# Patient Record
Sex: FEMALE | Race: WHITE | Employment: STUDENT | URBAN - METROPOLITAN AREA
[De-identification: names, ages, dates, MRNs, and addresses within clinical notes are randomized per-mention and may not be internally consistent; named-entity substitution may affect disease eponyms.]

---

## 2017-01-17 ENCOUNTER — OFFICE VISIT (OUTPATIENT)
Dept: ONCOLOGY | Age: 27
End: 2017-01-17

## 2017-01-17 VITALS
BODY MASS INDEX: 31.79 KG/M2 | HEART RATE: 97 BPM | WEIGHT: 186.2 LBS | DIASTOLIC BLOOD PRESSURE: 79 MMHG | HEIGHT: 64 IN | SYSTOLIC BLOOD PRESSURE: 110 MMHG | OXYGEN SATURATION: 97 % | TEMPERATURE: 98.6 F | RESPIRATION RATE: 18 BRPM

## 2017-01-17 DIAGNOSIS — I82.4Y2 ACUTE DEEP VEIN THROMBOSIS (DVT) OF PROXIMAL VEIN OF LEFT LOWER EXTREMITY (HCC): ICD-10-CM

## 2017-01-17 DIAGNOSIS — I26.99 OTHER PULMONARY EMBOLISM WITHOUT ACUTE COR PULMONALE, UNSPECIFIED CHRONICITY (HCC): Primary | ICD-10-CM

## 2017-01-17 RX ORDER — METHOCARBAMOL 500 MG/1
TABLET, FILM COATED ORAL 4 TIMES DAILY
COMMUNITY

## 2017-01-17 RX ORDER — CYCLOBENZAPRINE HCL 5 MG
5 TABLET ORAL
COMMUNITY

## 2017-01-17 NOTE — MR AVS SNAPSHOT
Visit Information Date & Time Provider Department Dept. Phone Encounter #  
 1/17/2017  3:00 PM David Washington  [facility] at [facility] [phone] Your Appointments 4/26/2017  2:45 PM  
ROUTINE CARE with Laila Keita MD  
[facility] Internal Medicine [address]) Appt Note: 6 month follow up [address] [place] [address] [address] [place] [address]  
[phone]  
  
   
 [address] Upcoming Health Maintenance Date Due  
 HPV AGE 9Y-34Y (1 of 3 - Female 3 Dose Series) 2/26/2002 Pneumococcal 19-64 Medium Risk (1 of 1 - PPSV23) 2/26/2010 DTaP/Tdap/Td series (1 - Tdap) 2/26/2012 PAP AKA CERVICAL CYTOLOGY 2/26/2012 Allergies as of 1/17/2017  Review Complete On: 1/17/2017 By: Jarvis Coffey LPN No Known Allergies Current Immunizations  Reviewed on 12/5/2016 No immunizations on file. Not reviewed this visit Vitals BP Pulse Temp Resp Height(growth percentile) Weight(growth percentile) 110/79 (BP 1 Location: Left arm, BP Patient Position: Sitting) 97 98.6 °F (37 °C) (Oral) 18 5' 4\" (1.626 m) 186 lb 3.2 oz (84.5 kg) LMP SpO2 BMI OB Status Smoking Status 12/05/2016 97% 31.96 kg/m2 Having regular periods Current Some Day Smoker Vitals History BMI and BSA Data Body Mass Index Body Surface Area 31.96 kg/m 2 1.95 m 2 Preferred Pharmacy Pharmacy Name Phone [id number] [phone] Your Updated Medication List  
  
   
This list is accurate as of: 1/17/17  3:46 PM.  Always use your most recent med list.  
  
  
  
  
 albuterol 90 mcg/actuation inhaler Commonly known as:  PROVENTIL HFA Take 1 Puff by inhalation every six (6) hours as needed for Wheezing. Indications: EXERCISE-INDUCED BRONCHOSPASM PREVENTION  
  
 ALLEGRA 180 mg tablet Generic drug:  fexofenadine Take 180 mg by mouth daily as needed for Allergies. CLA PO Take 1 Tab by mouth daily. cyanocobalamin 500 mcg tablet Commonly known as:  VITAMIN B12 Take 500 mcg by mouth daily. cyclobenzaprine 5 mg tablet Commonly known as:  FLEXERIL Take 5 mg by mouth. FISH OIL 1,000 mg Cap Generic drug:  omega-3 fatty acids-vitamin e Take 1 Cap by mouth. methocarbamol 500 mg tablet Commonly known as:  ROBAXIN Take  by mouth four (4) times daily. modafinil 100 mg tablet Commonly known as:  PROVIGIL Take  by mouth every morning.  
  
 multivitamin tablet Commonly known as:  ONE A DAY Take 1 Tab by mouth daily. MYOCALM 25-50-20-10 mg Tab Generic drug:  Dhaval La-Mag C-Pas Flw Ex-Carolyn Rt Take  by mouth. PROBIOTIC 4X 10-15 mg Tbec Generic drug:  B.infantis-B.ani-B.long-B.bifi Take  by mouth.  
  
 rivaroxaban 20 mg Tab tablet Commonly known as:  Rock Snowball Take 1 Tab by mouth daily (with breakfast). First dose will be 9/18/2016 Start taking on:  9/28/2019  
  
 traMADol 50 mg tablet Commonly known as:  ULTRAM  
Take 1 Tab by mouth every six (6) hours as needed for Pain. Max Daily Amount: 200 mg. VITAMIN D3 1,000 unit tablet Generic drug:  cholecalciferol Take 1,000 Units by mouth daily. Introducing [facility]! Denny Potts introduces [employer] patient portal. Now you can access parts of your medical record, email your doctor's office, and request medication refills online. 1. In your internet browser, go to [email or web address] 2. Click on the First Time User? Click Here link in the Sign In box. You will see the New Member Sign Up page. 3. Enter your [employer] Access Code exactly as it appears below. You will not need to use this code after youve completed the sign-up process. If you do not sign up before the expiration date, you must request a new code.  
 
· [employer] Access Code: [email or web address] 
 Expires: 3/5/2017 12:56 PM 
 
4. Enter the last four digits of your Social Security Number (xxxx) and Date of Birth (mm/dd/yyyy) as indicated and click Submit. You will be taken to the next sign-up page. 5. Create a [employer] ID. This will be your [employer] login ID and cannot be changed, so think of one that is secure and easy to remember. 6. Create a [employer] password. You can change your password at any time. 7. Enter your Password Reset Question and Answer. This can be used at a later time if you forget your password. 8. Enter your e-mail address. You will receive e-mail notification when new information is available in [address] 9. Click Sign Up. You can now view and download portions of your medical record. 10. Click the Download Summary menu link to download a portable copy of your medical information. If you have questions, please visit the Frequently Asked Questions section of the [employer] website. Remember, [employer] is NOT to be used for urgent needs. For medical emergencies, dial 911. Now available from your iPhone and Android! Please provide this summary of care documentation to your next provider. Your primary care clinician is listed as Jayden Yeager. If you have any questions after today's visit, please call [phone].

## 2017-01-17 NOTE — PROGRESS NOTES
[id number] [facility] Oncology at [facility]    Hematology follow up note     Reason for Visit:   Annamarie Solorzano is a 23 y.o. female who is seen in consultation at the request of Dr. Vera Burton for evaluation of DVT/PE    Today presents after 3 months of AC    History of Present Illness:   Patient is a 23 year female with a h/o DVT and PE . She initially developed L calf pain in late August 2016. Pain did not improve with Tylenol. There was no swelling. In the week before this symptom she recalls having driven back and forth to [place] (24 hour drive) without many breaks. She does not recall any leg swelling. She has asthma, gets some exercise induced SOB but breathing was unchanged around that time. She was seen in the ER on 8/27. CBC and CMP were unremarkable. A LE Duplex showed an acute thrombus of the L peroneal vein. CT chest showed multiple bilateral pulmonary emboli. Patient was started on Xarelto. She has taken Xarelto regularly x 3 months. She had a Nuvaring for 3-4 years prior to her VTE which she subsequently removed. Since her DVT she has made several lifestyle changes such as exercising an hour for  4 times a week and eating healthy. She has since lost 7 lb. She had noted heavy menstrual bleeding on Xarelto (uses overnight pads, changes pads  every 4 hours which she uses with Tampons). No other significant bleeding. Denies any fevers, chills, lumps or bumps, HA, vsion changes, hematuria, diarrhea, abdominal pain. Has no family or personal history of previous clots. She is not using any contraception now. Has had no Leg pain, swelling, CP, SOB, cough or hemoptysis. Off Xarelto for now over a month. APLA, Protein C, Protein S, AT III, FVLeiden and prothrombin gene mutation testing negative.        Past Medical History   Diagnosis Date    Asthma     DVT (deep venous thrombosis) (HCC)      left    Excessive daytime sleepiness     Genital herpes     Narcolepsy     Pulmonary emboli (HCC)      DVT left    Thromboembolus [facility])       History reviewed. No pertinent past surgical history. Social History   Substance Use Topics    Smoking status: Current Some Day Smoker     Types: Cigarettes    Smokeless tobacco: Never Used    Alcohol use Yes      Comment: socially      Family History   Problem Relation Age of Onset    No Known Problems Mother     Heart Disease Father     Diabetes Father    No FH of blood clots    Current Outpatient Prescriptions   Medication Sig    omega-3 fatty acids-vitamin e (FISH OIL) 1,000 mg cap Take 1 Cap by mouth.  B.infantis-B.ani-B.long-B.bifi (PROBIOTIC 4X) 10-15 mg TbEC Take  by mouth.  Dhaval La-Mag C-Pas Flw Ex-Carolyn Rt (MYOCALM) 25-50-20-10 mg tab Take  by mouth.  cyclobenzaprine (FLEXERIL) 5 mg tablet Take 5 mg by mouth.  methocarbamol (ROBAXIN) 500 mg tablet Take  by mouth four (4) times daily.  albuterol (PROVENTIL HFA) 90 mcg/actuation inhaler Take 1 Puff by inhalation every six (6) hours as needed for Wheezing. Indications: EXERCISE-INDUCED BRONCHOSPASM PREVENTION    traMADol (ULTRAM) 50 mg tablet Take 1 Tab by mouth every six (6) hours as needed for Pain. Max Daily Amount: 200 mg.    modafinil (PROVIGIL) 100 mg tablet Take  by mouth every morning.  multivitamin (ONE A DAY) tablet Take 1 Tab by mouth daily.  cholecalciferol (VITAMIN D3) 1,000 unit tablet Take 1,000 Units by mouth daily.  SAFFLOWER OIL/LINOLEIC ACID,CO (CLA PO) Take 1 Tab by mouth daily.  fexofenadine (ALLEGRA) 180 mg tablet Take 180 mg by mouth daily as needed for Allergies.  cyanocobalamin (VITAMIN B12) 500 mcg tablet Take 500 mcg by mouth daily.  [START ON 9/28/2019] rivaroxaban (XARELTO) 20 mg tab tablet Take 1 Tab by mouth daily (with breakfast). First dose will be 9/18/2016     No current facility-administered medications for this visit.        No Known Allergies     Review of Systems: A complete review of systems was obtained, negative except as described above. Physical Exam:     Visit Vitals    /79 (BP 1 Location: Left arm, BP Patient Position: Sitting)    Pulse 97    Temp 98.6 °F (37 °C) (Oral)    Resp 18    Ht 5' 4\" (1.626 m)    Wt 186 lb 3.2 oz (84.5 kg)    LMP 12/05/2016    SpO2 97%    BMI 31.96 kg/m2     ECOG PS:0  General: No distress  Eyes: PERRLA, anicteric sclerae  HENT: Atraumatic, OP clear  Neck: Supple  Lymphatic: No cervical, axillary, supraclavicular, or inguinal adenopathy  Respiratory: CTAB, normal respiratory effort  CV: Normal rate, regular rhythm, no murmurs, no peripheral edema  GI: Soft, nontender, nondistended, no masses, no hepatomegaly, no splenomegaly  MS: Normal gait and station. Digits without clubbing or cyanosis. Skin: No new rashes, ecchymoses, or petechiae. Normal temperature, turgor, and texture. Psych: Alert, oriented, appropriate affect, normal judgment/insight  Neuro: CN II-XII intact    Results:     Lab Results   Component Value Date/Time    WBC 6.4 01/03/2017 10:20 AM    HGB 13.5 01/03/2017 10:20 AM    HCT 40.6 01/03/2017 10:20 AM    PLATELET 542 39/08/2467 10:20 AM    MCV 86 01/03/2017 10:20 AM    ABS. NEUTROPHILS 3.3 01/03/2017 10:20 AM     Lab Results   Component Value Date/Time    Sodium 135 01/03/2017 10:20 AM    Potassium 4.3 01/03/2017 10:20 AM    Chloride 99 01/03/2017 10:20 AM    CO2 21 01/03/2017 10:20 AM    Glucose 89 01/03/2017 10:20 AM    BUN 18 01/03/2017 10:20 AM    Creatinine 0.76 01/03/2017 10:20 AM    GFR est  01/03/2017 10:20 AM    GFR est non- 01/03/2017 10:20 AM    Calcium 9.2 01/03/2017 10:20 AM     Lab Results   Component Value Date/Time    Bilirubin, total <0.2 01/03/2017 10:20 AM    ALT 31 01/03/2017 10:20 AM    AST 25 01/03/2017 10:20 AM    Alk.  phosphatase 84 01/03/2017 10:20 AM    Protein, total 7.3 01/03/2017 10:20 AM    Albumin 4.4 01/03/2017 10:20 AM    Globulin 4.2 08/27/2016 12:54 PM     Reviewed CT chest and LE dopplers    Assessment and Recommendations:   1. 23 y.o. female with symptomatic L distal vein DVT and asymptomatic bilateral PE diagnosed 8/27/16.   2. Intermediate risk for recurrence based on potential provoking factors such as BMI > 30 , estrogen use and immobility. She has had DVT/PE likely provoked by weak risk factors, hence risk of recurrence on stopping AC not clear. She is off Nuvaring and has lost some weight. Has no FH suggesting an inherited thrombophilia, however the bilateral PEs in the absence of a proximal vein DVT are an unusual presentation, hence tested for thrombophilia. Work up for hereditary thrombophilia is negative. She has been off Xarelto for 1 month and is doing well. No further anticoagulation except in high estrogen states. Plan    · No further anticoagulation  · Consider progestin only birth control/ Mirena IUD  · If and when she is pregnant, consider 6 weeks of postpartum AC      Discharge from clinic. The above exam and treatment plan were reviewed with Dr. Mariya Dill. Counseled patient to call with any new LE pain, swelling, CP, SOB.      Signed By: Rajinder Quintainlla MD     January 17, 2017

## 2017-04-12 ENCOUNTER — TELEPHONE (OUTPATIENT)
Dept: INTERNAL MEDICINE CLINIC | Age: 27
End: 2017-04-12

## 2017-04-12 NOTE — TELEPHONE ENCOUNTER
Call placed to pt and left message stating that labs were mailed to pt in January but if she needs them refaxed to her to let the office know, also office staff will provide her with number for mychart

## 2017-04-12 NOTE — TELEPHONE ENCOUNTER
----- Message from [facility] sent at 4/11/2017  7:23 PM EDT -----  Regarding: Dr. Lari Mews Sherren Blanch  Pt requesting a call to compare her lab values from yesterday to the labs she had done in January. Pt needs an activation code for my chart. Pt best contact number [phone].

## 2023-05-01 ENCOUNTER — RX ONLY (RX ONLY)
Age: 33
End: 2023-05-01

## 2023-05-01 ENCOUNTER — APPOINTMENT (OUTPATIENT)
Dept: URBAN - NONMETROPOLITAN AREA CLINIC 46 | Age: 33
Setting detail: DERMATOLOGY
End: 2023-05-09

## 2023-05-01 DIAGNOSIS — L65.0 TELOGEN EFFLUVIUM: ICD-10-CM

## 2023-05-01 DIAGNOSIS — L21.8 OTHER SEBORRHEIC DERMATITIS: ICD-10-CM

## 2023-05-01 PROBLEM — D23.39 OTHER BENIGN NEOPLASM OF SKIN OF OTHER PARTS OF FACE: Status: ACTIVE | Noted: 2023-05-01

## 2023-05-01 PROCEDURE — OTHER RECOMMENDATIONS: OTHER

## 2023-05-01 PROCEDURE — OTHER PRESCRIPTION: OTHER

## 2023-05-01 PROCEDURE — OTHER COUNSELING: OTHER

## 2023-05-01 PROCEDURE — 99204 OFFICE O/P NEW MOD 45 MIN: CPT

## 2023-05-01 PROCEDURE — OTHER MIPS QUALITY: OTHER

## 2023-05-01 PROCEDURE — OTHER REASSURANCE: OTHER

## 2023-05-01 RX ORDER — KETOCONAZOLE 20 MG/ML
SHAMPOO, SUSPENSION TOPICAL
Qty: 120 | Refills: 3 | Status: ERX

## 2023-05-01 RX ORDER — KETOCONAZOLE 20 MG/ML
SHAMPOO, SUSPENSION TOPICAL
Qty: 120 | Refills: 5 | Status: ERX | COMMUNITY
Start: 2023-05-01

## 2023-05-01 ASSESSMENT — LOCATION DETAILED DESCRIPTION DERM
LOCATION DETAILED: LEFT MEDIAL FRONTAL SCALP
LOCATION DETAILED: RIGHT CENTRAL PARIETAL SCALP
LOCATION DETAILED: LEFT INFERIOR OCCIPITAL SCALP
LOCATION DETAILED: RIGHT SUPERIOR OCCIPITAL SCALP
LOCATION DETAILED: RIGHT INFERIOR PARIETAL SCALP
LOCATION DETAILED: RIGHT CENTRAL FRONTAL SCALP
LOCATION DETAILED: LEFT CENTRAL PARIETAL SCALP

## 2023-05-01 ASSESSMENT — LOCATION SIMPLE DESCRIPTION DERM
LOCATION SIMPLE: LEFT SCALP
LOCATION SIMPLE: RIGHT SCALP
LOCATION SIMPLE: RIGHT OCCIPITAL SCALP
LOCATION SIMPLE: SCALP

## 2023-05-01 ASSESSMENT — LOCATION ZONE DERM: LOCATION ZONE: SCALP

## 2023-05-01 NOTE — PROCEDURE: COUNSELING
Shampoo Recommendations: TSal shampoo \\nWash scalp 2 x weekly. Leave in for 5 mins before rinsing.
Detail Level: Zone
Detail Level: Detailed

## 2023-05-01 NOTE — PROCEDURE: RECOMMENDATIONS
Recommendations (Free Text): Rogaine over the counter, vitamin D supplement, and multivitamin
Render Risk Assessment In Note?: no
Detail Level: Detailed

## 2023-05-01 NOTE — HPI: HAIR LOSS
Previous Labs: No
How Did The Hair Loss Occur?: gradual in onset
What Hair Products Do You Use?: Oleplex shampoo and conditioner

## 2023-08-01 ENCOUNTER — APPOINTMENT (OUTPATIENT)
Dept: URBAN - NONMETROPOLITAN AREA CLINIC 46 | Age: 33
Setting detail: DERMATOLOGY
End: 2023-08-10

## 2023-08-01 DIAGNOSIS — D22 MELANOCYTIC NEVI: ICD-10-CM

## 2023-08-01 DIAGNOSIS — D18.0 HEMANGIOMA: ICD-10-CM

## 2023-08-01 DIAGNOSIS — Q819 OTHER SPECIFIED ANOMALIES OF SKIN: ICD-10-CM

## 2023-08-01 DIAGNOSIS — Q826 OTHER SPECIFIED ANOMALIES OF SKIN: ICD-10-CM

## 2023-08-01 DIAGNOSIS — Q828 OTHER SPECIFIED ANOMALIES OF SKIN: ICD-10-CM

## 2023-08-01 DIAGNOSIS — L81.4 OTHER MELANIN HYPERPIGMENTATION: ICD-10-CM

## 2023-08-01 PROBLEM — D18.01 HEMANGIOMA OF SKIN AND SUBCUTANEOUS TISSUE: Status: ACTIVE | Noted: 2023-08-01

## 2023-08-01 PROBLEM — D23.72 OTHER BENIGN NEOPLASM OF SKIN OF LEFT LOWER LIMB, INCLUDING HIP: Status: ACTIVE | Noted: 2023-08-01

## 2023-08-01 PROBLEM — L85.8 OTHER SPECIFIED EPIDERMAL THICKENING: Status: ACTIVE | Noted: 2023-08-01

## 2023-08-01 PROBLEM — D22.62 MELANOCYTIC NEVI OF LEFT UPPER LIMB, INCLUDING SHOULDER: Status: ACTIVE | Noted: 2023-08-01

## 2023-08-01 PROBLEM — D22.71 MELANOCYTIC NEVI OF RIGHT LOWER LIMB, INCLUDING HIP: Status: ACTIVE | Noted: 2023-08-01

## 2023-08-01 PROBLEM — D22.5 MELANOCYTIC NEVI OF TRUNK: Status: ACTIVE | Noted: 2023-08-01

## 2023-08-01 PROCEDURE — OTHER MONITORING: OTHER

## 2023-08-01 PROCEDURE — OTHER COUNSELING: OTHER

## 2023-08-01 PROCEDURE — OTHER MIPS QUALITY: OTHER

## 2023-08-01 PROCEDURE — OTHER REASSURANCE: OTHER

## 2023-08-01 PROCEDURE — 99213 OFFICE O/P EST LOW 20 MIN: CPT

## 2023-08-01 ASSESSMENT — LOCATION DETAILED DESCRIPTION DERM
LOCATION DETAILED: EPIGASTRIC SKIN
LOCATION DETAILED: LEFT LATERAL TRAPEZIAL NECK
LOCATION DETAILED: LEFT MEDIAL BREAST 9-10:00 REGION
LOCATION DETAILED: RIGHT PROXIMAL LATERAL POSTERIOR UPPER ARM
LOCATION DETAILED: LEFT PROXIMAL POSTERIOR UPPER ARM
LOCATION DETAILED: LEFT ANTERIOR PROXIMAL UPPER ARM
LOCATION DETAILED: RIGHT ANTERIOR PROXIMAL THIGH
LOCATION DETAILED: LEFT MEDIAL BREAST 10-11:00 REGION

## 2023-08-01 ASSESSMENT — LOCATION ZONE DERM
LOCATION ZONE: TRUNK
LOCATION ZONE: ARM
LOCATION ZONE: NECK
LOCATION ZONE: LEG

## 2023-08-01 ASSESSMENT — LOCATION SIMPLE DESCRIPTION DERM
LOCATION SIMPLE: ABDOMEN
LOCATION SIMPLE: RIGHT POSTERIOR UPPER ARM
LOCATION SIMPLE: POSTERIOR NECK
LOCATION SIMPLE: RIGHT THIGH
LOCATION SIMPLE: LEFT BREAST
LOCATION SIMPLE: LEFT POSTERIOR UPPER ARM
LOCATION SIMPLE: LEFT UPPER ARM

## 2025-02-28 ENCOUNTER — APPOINTMENT (OUTPATIENT)
Dept: URBAN - NONMETROPOLITAN AREA CLINIC 46 | Age: 35
Setting detail: DERMATOLOGY
End: 2025-03-06

## 2025-02-28 DIAGNOSIS — D22 MELANOCYTIC NEVI: ICD-10-CM

## 2025-02-28 DIAGNOSIS — E78.2 MIXED HYPERLIPIDEMIA: ICD-10-CM

## 2025-02-28 DIAGNOSIS — B35.3 TINEA PEDIS: ICD-10-CM

## 2025-02-28 DIAGNOSIS — L20.89 OTHER ATOPIC DERMATITIS: ICD-10-CM

## 2025-02-28 DIAGNOSIS — L81.4 OTHER MELANIN HYPERPIGMENTATION: ICD-10-CM

## 2025-02-28 PROBLEM — D22.5 MELANOCYTIC NEVI OF TRUNK: Status: ACTIVE | Noted: 2025-02-28

## 2025-02-28 PROBLEM — D22.71 MELANOCYTIC NEVI OF RIGHT LOWER LIMB, INCLUDING HIP: Status: ACTIVE | Noted: 2025-02-28

## 2025-02-28 PROCEDURE — OTHER PRESCRIPTION: OTHER

## 2025-02-28 PROCEDURE — OTHER REASSURANCE: OTHER

## 2025-02-28 PROCEDURE — 99214 OFFICE O/P EST MOD 30 MIN: CPT

## 2025-02-28 PROCEDURE — OTHER COUNSELING: OTHER

## 2025-02-28 PROCEDURE — OTHER MIPS QUALITY: OTHER

## 2025-02-28 RX ORDER — KETOCONAZOLE 20 MG/G
CREAM TOPICAL
Qty: 60 | Refills: 3 | Status: ERX | COMMUNITY
Start: 2025-02-28

## 2025-02-28 RX ORDER — TRIAMCINOLONE ACETONIDE 1 MG/G
CREAM TOPICAL BID
Qty: 80 | Refills: 1 | Status: ERX | COMMUNITY
Start: 2025-02-28

## 2025-02-28 ASSESSMENT — BSA ECZEMA: % BODY COVERED IN ECZEMA: 2

## 2025-02-28 ASSESSMENT — ITCH NUMERIC RATING SCALE: HOW SEVERE IS YOUR ITCHING?: 8

## 2025-02-28 ASSESSMENT — LOCATION ZONE DERM
LOCATION ZONE: LEG
LOCATION ZONE: HAND
LOCATION ZONE: TRUNK
LOCATION ZONE: FACE
LOCATION ZONE: NECK

## 2025-02-28 ASSESSMENT — LOCATION SIMPLE DESCRIPTION DERM
LOCATION SIMPLE: POSTERIOR NECK
LOCATION SIMPLE: LEFT BREAST
LOCATION SIMPLE: RIGHT THIGH
LOCATION SIMPLE: LEFT TEMPLE
LOCATION SIMPLE: RIGHT HAND

## 2025-02-28 ASSESSMENT — LOCATION DETAILED DESCRIPTION DERM
LOCATION DETAILED: RIGHT ANTERIOR PROXIMAL THIGH
LOCATION DETAILED: LEFT MEDIAL BREAST 9-10:00 REGION
LOCATION DETAILED: LEFT LATERAL TRAPEZIAL NECK
LOCATION DETAILED: RIGHT RADIAL DORSAL HAND
LOCATION DETAILED: LEFT INFERIOR TEMPLE
LOCATION DETAILED: LEFT MEDIAL BREAST 10-11:00 REGION

## 2025-02-28 ASSESSMENT — SEVERITY ASSESSMENT 2020: SEVERITY 2020: MODERATE
